# Patient Record
Sex: FEMALE | Race: WHITE | NOT HISPANIC OR LATINO
[De-identification: names, ages, dates, MRNs, and addresses within clinical notes are randomized per-mention and may not be internally consistent; named-entity substitution may affect disease eponyms.]

---

## 2019-04-30 ENCOUNTER — FORM ENCOUNTER (OUTPATIENT)
Age: 74
End: 2019-04-30

## 2020-07-21 ENCOUNTER — FORM ENCOUNTER (OUTPATIENT)
Age: 75
End: 2020-07-21

## 2021-06-18 PROBLEM — Z00.00 ENCOUNTER FOR PREVENTIVE HEALTH EXAMINATION: Status: ACTIVE | Noted: 2021-06-18

## 2021-07-13 DIAGNOSIS — Z78.9 OTHER SPECIFIED HEALTH STATUS: ICD-10-CM

## 2021-07-13 DIAGNOSIS — Z86.39 PERSONAL HISTORY OF OTHER ENDOCRINE, NUTRITIONAL AND METABOLIC DISEASE: ICD-10-CM

## 2021-07-13 DIAGNOSIS — Z80.3 FAMILY HISTORY OF MALIGNANT NEOPLASM OF BREAST: ICD-10-CM

## 2021-07-13 DIAGNOSIS — Z86.59 PERSONAL HISTORY OF OTHER MENTAL AND BEHAVIORAL DISORDERS: ICD-10-CM

## 2021-07-13 DIAGNOSIS — R92.2 INCONCLUSIVE MAMMOGRAM: ICD-10-CM

## 2021-07-14 ENCOUNTER — APPOINTMENT (OUTPATIENT)
Dept: BREAST CENTER | Facility: CLINIC | Age: 76
End: 2021-07-14

## 2021-08-25 ENCOUNTER — APPOINTMENT (OUTPATIENT)
Dept: BREAST CENTER | Facility: CLINIC | Age: 76
End: 2021-08-25
Payer: MEDICARE

## 2021-08-25 ENCOUNTER — NON-APPOINTMENT (OUTPATIENT)
Age: 76
End: 2021-08-25

## 2021-08-25 VITALS
WEIGHT: 135 LBS | BODY MASS INDEX: 23.05 KG/M2 | SYSTOLIC BLOOD PRESSURE: 137 MMHG | DIASTOLIC BLOOD PRESSURE: 87 MMHG | HEART RATE: 82 BPM | HEIGHT: 64 IN | OXYGEN SATURATION: 97 %

## 2021-08-25 PROCEDURE — 99213 OFFICE O/P EST LOW 20 MIN: CPT

## 2021-08-25 NOTE — HISTORY OF PRESENT ILLNESS
[FreeTextEntry1] : The patient is a 75-year-old  postmenopausal white female of [place] descent.  She underwent menarche at age 11 and had her first child at age 25.  She underwent menopause at age 33 and did take hormone replacement therapy for almost 20 years.  She underwent menopause after a total abdominal hysterectomy at age 34 for a prolapsed uterus.  She has a family history with her sister who had breast cancer at age 47 and passed away at age 50.  The patient comes in for routine yearly follow-up and gets yearly mammography.

## 2021-08-25 NOTE — ASSESSMENT
[FreeTextEntry1] : The patient is a 75-year-old  postmenopausal white female of [place] descent.  She underwent menarche at age 11 and had her first child at age 25.  She underwent menopause at age 33 and did take hormone replacement therapy for almost 20 years.  She underwent menopause after a total abdominal hysterectomy at age 34 for a prolapsed uterus.  She has a family history with her sister who had breast cancer at age 47 and passed away at age 50.  Her last bilateral mammography and ultrasound on 2020 showed no suspicious findings but dense heterogeneous breast tissue.  On exam today, I cannot feel any suspicious densities in either breast.  She underwent a bilateral mammography today here at [facility] on 2021 which showed no suspicious findings.  The patient was reassured and should follow-up again in 1 year in 2022 and will be due for her next bilateral mammography at that time.

## 2021-08-25 NOTE — PAST MEDICAL HISTORY
[Postmenopausal] : The patient is postmenopausal [Menarche Age ____] : age at menarche was [unfilled] [Menopause Age____] : age at menopause was [unfilled] [History of Hormone Replacement Treatment] : has a history of hormone replacement treatment [Total Preg ___] : G[unfilled] [Live Births ___] : P[unfilled]  [Age At Live Birth ___] : Age at live birth: [unfilled] [de-identified] : s/p JADEN at age 34 for prolapsed uterus

## 2021-08-25 NOTE — REASON FOR VISIT
[Follow-Up: _____] : a [unfilled] follow-up visit [FreeTextEntry1] : The patient comes in with a strong family history of breast cancer for routine yearly follow-up and mammography.

## 2021-08-25 NOTE — PHYSICAL EXAM
[Normocephalic] : normocephalic [Atraumatic] : atraumatic [EOMI] : extra ocular movement intact [Supple] : supple [No Supraclavicular Adenopathy] : no supraclavicular adenopathy [No Cervical Adenopathy] : no cervical adenopathy [Examined in the supine and seated position] : examined in the supine and seated position [No dominant masses] : no dominant masses in right breast  [No dominant masses] : no dominant masses left breast [No Nipple Retraction] : no left nipple retraction [No Nipple Discharge] : no left nipple discharge [Breast Mass Right Breast ___cm] : no masses [Breast Mass Left Breast ___cm] : no masses [Breast Nipple Inversion] : nipples not inverted [Breast Nipple Retraction] : nipples not retracted [Breast Nipple Flattening] : nipples not flattened [Breast Nipple Fissures] : nipples not fissured [Breast Abnormal Lactation (Galactorrhea)] : no galactorrhea [Breast Abnormal Secretion Bloody Fluid] : no bloody discharge [Breast Abnormal Secretion Serous Fluid] : no serous discharge [Breast Abnormal Secretion Opalescent Fluid] : no milky discharge [No Axillary Lymphadenopathy] : no left axillary lymphadenopathy [No Edema] : no edema [No Rashes] : no rashes [No Ulceration] : no ulceration [de-identified] : On exam, the patient has ptotic B-cup breasts.  On palpation, I cannot feel any suspicious densities in either breast.  She has no axillary, supraclavicular, or cervical adenopathy.

## 2022-08-25 NOTE — ASSESSMENT
[FreeTextEntry1] : The patient is a 76-year-old  postmenopausal white female of [place] descent.  She underwent menarche at age 11 and had her first child at age 25.  She underwent menopause at age 33 and did take hormone replacement therapy for almost 20 years.  She underwent menopause after a total abdominal hysterectomy at age 34 for a prolapsed uterus.  She has a family history with her sister who had breast cancer at age 47 and passed away at age 50.  Her last bilateral mammography and ultrasound was reviewed from ???????? on 2020 showed no suspicious findings but dense heterogeneous breast tissue.  On exam today, I cannot feel any suspicious densities in either breast.  The patient was reassured and should follow-up again in 1 year in 2023 and will be due for her next bilateral mammography and ultrasound in ??????? and she was given prescriptions..

## 2022-08-25 NOTE — PAST MEDICAL HISTORY
[Postmenopausal] : The patient is postmenopausal [Menarche Age ____] : age at menarche was [unfilled] [Menopause Age____] : age at menopause was [unfilled] [History of Hormone Replacement Treatment] : has a history of hormone replacement treatment [Total Preg ___] : G[unfilled] [Live Births ___] : P[unfilled]  [Age At Live Birth ___] : Age at live birth: [unfilled] [de-identified] : s/p JADEN at age 34 for prolapsed uterus

## 2022-08-25 NOTE — HISTORY OF PRESENT ILLNESS
[FreeTextEntry1] : The patient is a 76-year-old  postmenopausal white female of [place] descent.  She underwent menarche at age 11 and had her first child at age 25.  She underwent menopause at age 33 and did take hormone replacement therapy for almost 20 years.  She underwent menopause after a total abdominal hysterectomy at age 34 for a prolapsed uterus.  She has a family history with her sister who had breast cancer at age 47 and passed away at age 50.  The patient comes in for routine yearly follow-up and gets yearly mammography.

## 2022-08-25 NOTE — PHYSICAL EXAM
[Normocephalic] : normocephalic [Atraumatic] : atraumatic [EOMI] : extra ocular movement intact [Supple] : supple [No Supraclavicular Adenopathy] : no supraclavicular adenopathy [No Cervical Adenopathy] : no cervical adenopathy [Examined in the supine and seated position] : examined in the supine and seated position [No dominant masses] : no dominant masses in right breast  [No dominant masses] : no dominant masses left breast [No Nipple Retraction] : no left nipple retraction [No Nipple Discharge] : no left nipple discharge [Breast Mass Right Breast ___cm] : no masses [Breast Mass Left Breast ___cm] : no masses [Breast Nipple Inversion] : nipples not inverted [Breast Nipple Retraction] : nipples not retracted [Breast Nipple Flattening] : nipples not flattened [Breast Nipple Fissures] : nipples not fissured [Breast Abnormal Lactation (Galactorrhea)] : no galactorrhea [Breast Abnormal Secretion Bloody Fluid] : no bloody discharge [Breast Abnormal Secretion Serous Fluid] : no serous discharge [Breast Abnormal Secretion Opalescent Fluid] : no milky discharge [No Axillary Lymphadenopathy] : no left axillary lymphadenopathy [No Edema] : no edema [No Rashes] : no rashes [No Ulceration] : no ulceration [de-identified] : On exam, the patient has ptotic B-cup breasts.  On palpation, I cannot feel any suspicious densities in either breast.  She has no axillary, supraclavicular, or cervical adenopathy.

## 2022-09-01 ENCOUNTER — APPOINTMENT (OUTPATIENT)
Dept: BREAST CENTER | Facility: CLINIC | Age: 77
End: 2022-09-01

## 2022-10-24 NOTE — PAST MEDICAL HISTORY
[Postmenopausal] : The patient is postmenopausal [Menarche Age ____] : age at menarche was [unfilled] [Menopause Age____] : age at menopause was [unfilled] [History of Hormone Replacement Treatment] : has a history of hormone replacement treatment [Total Preg ___] : G[unfilled] [Live Births ___] : P[unfilled]  [Age At Live Birth ___] : Age at live birth: [unfilled] [de-identified] : s/p JADEN at age 34 for prolapsed uterus

## 2022-10-24 NOTE — PHYSICAL EXAM
[Normocephalic] : normocephalic [Atraumatic] : atraumatic [EOMI] : extra ocular movement intact [Supple] : supple [No Supraclavicular Adenopathy] : no supraclavicular adenopathy [No Cervical Adenopathy] : no cervical adenopathy [Examined in the supine and seated position] : examined in the supine and seated position [No dominant masses] : no dominant masses in right breast  [No dominant masses] : no dominant masses left breast [No Nipple Retraction] : no left nipple retraction [No Nipple Discharge] : no left nipple discharge [Breast Mass Right Breast ___cm] : no masses [Breast Mass Left Breast ___cm] : no masses [Breast Nipple Inversion] : nipples not inverted [Breast Nipple Retraction] : nipples not retracted [Breast Nipple Flattening] : nipples not flattened [Breast Nipple Fissures] : nipples not fissured [Breast Abnormal Lactation (Galactorrhea)] : no galactorrhea [Breast Abnormal Secretion Bloody Fluid] : no bloody discharge [Breast Abnormal Secretion Serous Fluid] : no serous discharge [Breast Abnormal Secretion Opalescent Fluid] : no milky discharge [No Axillary Lymphadenopathy] : no left axillary lymphadenopathy [No Edema] : no edema [No Rashes] : no rashes [No Ulceration] : no ulceration [de-identified] : On exam, the patient has ptotic B-cup breasts.  On palpation, I cannot feel any suspicious densities in either breast.  She has no axillary, supraclavicular, or cervical adenopathy.

## 2022-10-24 NOTE — ASSESSMENT
[FreeTextEntry1] : The patient is a 77-year-old  postmenopausal white female of [place] descent.  She underwent menarche at age 11 and had her first child at age 25.  She underwent menopause at age 33 and did take hormone replacement therapy for almost 20 years.  She underwent menopause after a total abdominal hysterectomy at age 34 for a prolapsed uterus.  She has a family history with her sister who had breast cancer at age 47 and passed away at age 50.  Her last bilateral mammography and ultrasound was reviewed from ???????? 2021 performed at [facility] which showed no suspicious findings but dense heterogeneous breast tissue.  On exam today, I cannot feel any suspicious densities in either breast.  The patient was reassured and should follow-up again in 1 year in 2023 and will be due for her next bilateral mammography and ultrasound in ??????? and she was given prescriptions..

## 2022-10-24 NOTE — HISTORY OF PRESENT ILLNESS
[FreeTextEntry1] : The patient is a 77-year-old  postmenopausal white female of [place] descent.  She underwent menarche at age 11 and had her first child at age 25.  She underwent menopause at age 33 and did take hormone replacement therapy for almost 20 years.  She underwent menopause after a total abdominal hysterectomy at age 34 for a prolapsed uterus.  She has a family history with her sister who had breast cancer at age 47 and passed away at age 50.  The patient comes in for routine yearly follow-up and gets yearly mammography.

## 2022-10-31 ENCOUNTER — APPOINTMENT (OUTPATIENT)
Dept: BREAST CENTER | Facility: CLINIC | Age: 77
End: 2022-10-31

## 2022-10-31 ENCOUNTER — RESULT REVIEW (OUTPATIENT)
Age: 77
End: 2022-10-31

## 2022-11-29 DIAGNOSIS — N60.12 DIFFUSE CYSTIC MASTOPATHY OF LEFT BREAST: ICD-10-CM

## 2022-11-29 DIAGNOSIS — N60.11 DIFFUSE CYSTIC MASTOPATHY OF LEFT BREAST: ICD-10-CM

## 2022-12-05 ENCOUNTER — APPOINTMENT (OUTPATIENT)
Dept: BREAST CENTER | Facility: CLINIC | Age: 77
End: 2022-12-05

## 2022-12-05 VITALS — BODY MASS INDEX: 22.2 KG/M2 | HEART RATE: 85 BPM | WEIGHT: 130 LBS | HEIGHT: 64 IN | OXYGEN SATURATION: 98 %

## 2022-12-05 PROCEDURE — 99213 OFFICE O/P EST LOW 20 MIN: CPT

## 2022-12-05 NOTE — PHYSICAL EXAM
[Normocephalic] : normocephalic [Atraumatic] : atraumatic [EOMI] : extra ocular movement intact [Supple] : supple [No Supraclavicular Adenopathy] : no supraclavicular adenopathy [No Cervical Adenopathy] : no cervical adenopathy [Examined in the supine and seated position] : examined in the supine and seated position [No dominant masses] : no dominant masses in right breast  [No dominant masses] : no dominant masses left breast [No Nipple Retraction] : no left nipple retraction [No Nipple Discharge] : no left nipple discharge [Breast Mass Right Breast ___cm] : no masses [Breast Mass Left Breast ___cm] : no masses [No Axillary Lymphadenopathy] : no left axillary lymphadenopathy [No Edema] : no edema [No Rashes] : no rashes [No Ulceration] : no ulceration [Breast Nipple Inversion] : nipples not inverted [Breast Nipple Retraction] : nipples not retracted [Breast Nipple Flattening] : nipples not flattened [Breast Nipple Fissures] : nipples not fissured [Breast Abnormal Lactation (Galactorrhea)] : no galactorrhea [Breast Abnormal Secretion Bloody Fluid] : no bloody discharge [Breast Abnormal Secretion Serous Fluid] : no serous discharge [Breast Abnormal Secretion Opalescent Fluid] : no milky discharge [de-identified] : On exam, the patient has ptotic B-cup breasts.  On palpation, I cannot feel any suspicious densities in either breast.  She has no axillary, supraclavicular, or cervical adenopathy.

## 2022-12-05 NOTE — HISTORY OF PRESENT ILLNESS
[FreeTextEntry1] : The patient is a 77-year-old  postmenopausal white female of [place] descent.  She underwent menarche at age 11 and had her first child at age 25.  She underwent menopause at age 33 and did take hormone replacement therapy for almost 20 years.  She underwent menopause after a total abdominal hysterectomy at age 34 for a prolapsed uterus.  She has a family history with her sister who had breast cancer at age 47 and passed away at age 50.  The patient comes in for routine yearly follow-up and gets yearly mammography and ultrasound.

## 2022-12-05 NOTE — ASSESSMENT
[FreeTextEntry1] : The patient is a 77-year-old  postmenopausal white female of [place] descent.  She underwent menarche at age 11 and had her first child at age 25.  She underwent menopause at age 33 and did take hormone replacement therapy for almost 20 years.  She underwent menopause after a total abdominal hysterectomy at age 34 for a prolapsed uterus.  She has a family history with her sister who had breast cancer at age 47 and passed away at age 50.  Her last bilateral mammography and ultrasound was reviewed from 2022 performed at [facility] which showed no suspicious findings but dense heterogeneous breast tissue.  On exam today, I cannot feel any suspicious densities in either breast.  The patient was reassured and should follow-up again in 1 year in 2023 and will be due for her next bilateral mammography and ultrasound in 2023 and she was given prescriptions..

## 2022-12-05 NOTE — PAST MEDICAL HISTORY
[Postmenopausal] : The patient is postmenopausal [Menarche Age ____] : age at menarche was [unfilled] [Menopause Age____] : age at menopause was [unfilled] [History of Hormone Replacement Treatment] : has a history of hormone replacement treatment [Total Preg ___] : G[unfilled] [Live Births ___] : P[unfilled]  [Age At Live Birth ___] : Age at live birth: [unfilled] [de-identified] : s/p JADEN at age 34 for prolapsed uterus

## 2023-10-31 ENCOUNTER — NON-APPOINTMENT (OUTPATIENT)
Age: 78
End: 2023-10-31

## 2023-11-06 ENCOUNTER — RESULT REVIEW (OUTPATIENT)
Age: 78
End: 2023-11-06

## 2023-11-10 ENCOUNTER — APPOINTMENT (OUTPATIENT)
Dept: BREAST CENTER | Facility: CLINIC | Age: 78
End: 2023-11-10
Payer: MEDICARE

## 2023-11-10 DIAGNOSIS — N60.12 DIFFUSE CYSTIC MASTOPATHY OF LEFT BREAST: ICD-10-CM

## 2023-11-10 DIAGNOSIS — Z12.39 ENCOUNTER FOR OTHER SCREENING FOR MALIGNANT NEOPLASM OF BREAST: ICD-10-CM

## 2023-11-10 DIAGNOSIS — N60.11 DIFFUSE CYSTIC MASTOPATHY OF LEFT BREAST: ICD-10-CM

## 2023-11-10 DIAGNOSIS — Z80.3 FAMILY HISTORY OF MALIGNANT NEOPLASM OF BREAST: ICD-10-CM

## 2023-11-10 PROCEDURE — 99212 OFFICE O/P EST SF 10 MIN: CPT

## 2024-10-19 ENCOUNTER — NON-APPOINTMENT (OUTPATIENT)
Age: 79
End: 2024-10-19

## 2024-11-08 ENCOUNTER — RESULT REVIEW (OUTPATIENT)
Age: 79
End: 2024-11-08

## 2024-11-08 ENCOUNTER — APPOINTMENT (OUTPATIENT)
Dept: BREAST CENTER | Facility: CLINIC | Age: 79
End: 2024-11-08
Payer: MEDICARE

## 2024-11-08 DIAGNOSIS — Z12.31 ENCOUNTER FOR SCREENING MAMMOGRAM FOR MALIGNANT NEOPLASM OF BREAST: ICD-10-CM

## 2024-11-08 DIAGNOSIS — N60.11 DIFFUSE CYSTIC MASTOPATHY OF LEFT BREAST: ICD-10-CM

## 2024-11-08 DIAGNOSIS — Z80.3 FAMILY HISTORY OF MALIGNANT NEOPLASM OF BREAST: ICD-10-CM

## 2024-11-08 DIAGNOSIS — N60.12 DIFFUSE CYSTIC MASTOPATHY OF LEFT BREAST: ICD-10-CM

## 2024-11-08 DIAGNOSIS — Z12.39 ENCOUNTER FOR OTHER SCREENING FOR MALIGNANT NEOPLASM OF BREAST: ICD-10-CM

## 2024-11-08 PROCEDURE — 99213 OFFICE O/P EST LOW 20 MIN: CPT
